# Patient Record
Sex: FEMALE | Race: OTHER | HISPANIC OR LATINO | URBAN - METROPOLITAN AREA
[De-identification: names, ages, dates, MRNs, and addresses within clinical notes are randomized per-mention and may not be internally consistent; named-entity substitution may affect disease eponyms.]

---

## 2019-10-16 ENCOUNTER — EMERGENCY (EMERGENCY)
Facility: HOSPITAL | Age: 74
LOS: 1 days | Discharge: HOME | End: 2019-10-16
Attending: STUDENT IN AN ORGANIZED HEALTH CARE EDUCATION/TRAINING PROGRAM
Payer: SELF-PAY

## 2019-10-16 VITALS
DIASTOLIC BLOOD PRESSURE: 75 MMHG | RESPIRATION RATE: 18 BRPM | SYSTOLIC BLOOD PRESSURE: 133 MMHG | HEART RATE: 74 BPM | WEIGHT: 139.99 LBS | TEMPERATURE: 98 F | OXYGEN SATURATION: 97 %

## 2019-10-16 VITALS
OXYGEN SATURATION: 97 % | RESPIRATION RATE: 18 BRPM | SYSTOLIC BLOOD PRESSURE: 121 MMHG | HEART RATE: 78 BPM | TEMPERATURE: 98 F | DIASTOLIC BLOOD PRESSURE: 71 MMHG

## 2019-10-16 DIAGNOSIS — Z98.890 OTHER SPECIFIED POSTPROCEDURAL STATES: Chronic | ICD-10-CM

## 2019-10-16 DIAGNOSIS — Z90.49 ACQUIRED ABSENCE OF OTHER SPECIFIED PARTS OF DIGESTIVE TRACT: Chronic | ICD-10-CM

## 2019-10-16 PROCEDURE — 73110 X-RAY EXAM OF WRIST: CPT | Mod: 26,LT

## 2019-10-16 PROCEDURE — 29125 APPL SHORT ARM SPLINT STATIC: CPT

## 2019-10-16 PROCEDURE — 73110 X-RAY EXAM OF WRIST: CPT

## 2019-10-16 PROCEDURE — 99283 EMERGENCY DEPT VISIT LOW MDM: CPT | Mod: 25

## 2019-10-16 PROCEDURE — 99284 EMERGENCY DEPT VISIT MOD MDM: CPT | Mod: 25

## 2019-10-16 PROCEDURE — 99053 MED SERV 10PM-8AM 24 HR FAC: CPT

## 2019-10-16 PROCEDURE — 29125 APPL SHORT ARM SPLINT STATIC: CPT | Mod: LT

## 2019-10-16 PROCEDURE — T1013: CPT

## 2019-10-16 RX ORDER — IBUPROFEN 200 MG
600 TABLET ORAL ONCE
Refills: 0 | Status: COMPLETED | OUTPATIENT
Start: 2019-10-16 | End: 2019-10-16

## 2019-10-16 RX ADMIN — Medication 600 MILLIGRAM(S): at 02:34

## 2019-10-16 NOTE — ED PROCEDURE NOTE - CPROC ED INFORMED CONSENT1
ED  Kimmie/Benefits, risks, and possible complications of procedure explained to patient/caregiver who verbalized understanding and gave verbal consent.

## 2019-10-16 NOTE — ED ADULT NURSE NOTE - CAS EDN DISCHARGE ASSESSMENT
Neuro vascular intact post splinting/Patient baseline mental status/Awake/Alert and oriented to person, place and time/No adverse reaction to first time med in ED/Symptoms improved

## 2019-10-16 NOTE — ED ADULT NURSE NOTE - CHPI ED NUR SYMPTOMS NEG
no stiffness/no weakness/no bruising/no difficulty bearing weight/no fever/no tingling/no back pain/no numbness/no abrasion/no deformity

## 2019-10-16 NOTE — ED PROVIDER NOTE - PROGRESS NOTE DETAILS
KIMO Vergara NOTE: Splint and sling applied. Pt stable for d/c, reports improvement, VSS, tolerating PO, ambulatory.  Discussion includes results, plan, and return precautions. Pt advised to f/u with PMD 1-2 days and specialists discussed.  Pt verbalized understanding/agreement of plan. ED  Kimmie.

## 2019-10-16 NOTE — ED ADULT NURSE NOTE - NSIMPLEMENTINTERV_GEN_ALL_ED
Implemented All Fall Risk Interventions:  [place] to call system. Call bell, personal items and telephone within reach. Instruct patient to call for assistance. Room bathroom lighting operational. Non-slip footwear when patient is off stretcher. Physically safe environment: no spills, clutter or unnecessary equipment. Stretcher in lowest position, wheels locked, appropriate side rails in place. Provide visual cue, wrist band, yellow gown, etc. Monitor gait and stability. Monitor for mental status changes and reorient to person, place, and time. Review medications for side effects contributing to fall risk. Reinforce activity limits and safety measures with patient and family.

## 2019-10-16 NOTE — ED PROVIDER NOTE - PATIENT PORTAL LINK FT
You can access the FollowMyHealth Patient Portal offered by [facility] by registering at the following website: [email or web address]. By joining [facility] FollowMyHealth portal, you will also be able to view your health information using other applications (apps) compatible with our system.

## 2019-10-16 NOTE — ED PROVIDER NOTE - ATTENDING CONTRIBUTION TO CARE
I personally saw the patient with the PA, and completed the key components of the history and physical exam. I then discussed the management plan with the PA.    with distal radius fracture, mildly displaced, NV intact, sugar tong splint, and ortho follow up

## 2019-10-16 NOTE — ED PROVIDER NOTE - CARE PROVIDER_API CALL
Anand Solano (DO)  Orthopaedic Surgery  [address] [place] [address]  Phone: [phone]  Fax: [phone]  Follow Up Time:

## 2019-10-16 NOTE — ED PROVIDER NOTE - CLINICAL SUMMARY MEDICAL DECISION MAKING FREE TEXT BOX
distal radius fracture 73 y/o F with left wrist pain s/p mechanical fall, neurologically intact, no other injuries, Left extremity NVI.  -Will check wrist XR, motrin  -Will follow up and re-evaluate patient

## 2019-10-16 NOTE — ED PROVIDER NOTE - CHPI ED SYMPTOMS NEG
no abrasion/no difficulty bearing weight/no numbness/no bruising/no back pain/no tingling/no weakness/no stiffness/no fever

## 2019-10-16 NOTE — ED PROVIDER NOTE - PHYSICAL EXAMINATION
Vital signs noted, see flowsheet.  General: Well nourished/developed. In no acute distress, well appearing and non-toxic.  HEENT: NC/AT. MMM. No nasal discharge, throat clear.  Conjunctiva and sclera clear b/l.  EOMI. PERRL.  Neck: Soft and supple  Cardiac: RRR. +S1/S2. Peripheral pulses 2+ and symmetric b/l.   Respiratory: Speaking in full sentences, no evidence of respiratory distress. Lungs CTA b/l, no wheezes/rhonchi/rales/stridor.   Abdomen: Soft, NTND. No guarding   Back: Spine midline and non-tender. No CVAT.  Neuro: Awake, alert and oriented to person/place/time/situation. Moves all extremities spontaneously and symmetrically.  No focal deficits or facial droop.  CN II-XII intact. Ambulatory with steady gait  Left upper extremity: No visible deformity. Mild swelling lateral wrist, no ecchymosis. TTP over distal radius. No snuffbox ttp. + FROM of shoulder/elbow/wrist/hand. No motor or sensory deficits. Sensation intact. Capillary refill less than 2 seconds.  strong

## 2019-10-16 NOTE — ED PROVIDER NOTE - OBJECTIVE STATEMENT
was sleeping, turned over and fell off bed and fell onto her left arm, 12:45am  just came 3 wks ago [place]   R handed   did not hit head, remembers falling, no LOC  took 1 Analgan (Paracetamol) 1g with mild relief, 12:50am   no neck/back pain, numbness/tingling,  PMD: was sleeping, turned over and fell off bed and fell onto her left arm, 12:45am  just came 3 wks ago [place]   R handed   did not hit head, remembers falling, no LOC, not on blood thinners  took 1 Analgan (Paracetamol) 1g with mild relief, 12:50am   no neck/back pain, numbness/tingling,  PMD: none in [place] 75 y/o F with PMHx HTN, DM (on oral medications) presents to ED c/o left wrist pain s/p mechanical fall tonight. Pt visiting states, has been here about 3 wks, states was sleeping, turned over and fell off bed, fell onto her left arm, happened at 12:45am. Pt did not hit head, remembers incident and falling, no LOC. Denies use of blood thinners. Pt only c/o left wrist pain. Pt is R handed. Took 1 Analgan (Paracetamol) 1g with mild relief at 12:50am. Denies fever/chills, syncope, CP, abd pain, neck/back pain, numbness/tingling, weakness.    PMD: none in [place]

## 2019-10-16 NOTE — ED ADULT NURSE NOTE - PAIN RATING/NUMBER SCALE (0-10): REST
Myra Burnette), Surgery  [address] [facility]  [address]  [place] [address]  Phone: [phone]  Fax: [phone]    Lobo Parks), Cardiovascular Disease; Nuclear Cardiology  [id number] [place] [address]  Phone: [phone]  Fax: [phone]
[phone]

## 2022-08-29 NOTE — ED PROVIDER NOTE - CARE PROVIDERS DIRECT ADDRESSES
She will need IUDS & contract next visit. Please make a note    PDMP reviewed; therapy appropriate, no aberrant behavior identified, prescription given.    Ok for refill   ,DirectAddress_Unknown